# Patient Record
(demographics unavailable — no encounter records)

---

## 2025-04-01 NOTE — DISCUSSION/SUMMARY
[de-identified] : ASSESSMENT Discussed findings of today's exam and possible cause of patient's pain.  Educated patient on their most probable diagnosis of osteoarthritis of the Right Knee. Explained that osteoarthritis is a degenerative disease of the knee that causes progressive loss of articular cartilage. Risk factors for OA include age, joint iry, obesity, genetics, anatomical factors including joint shape and alignment, and sex.  Reviewed possible courses of treatment including nonoperative treatment modalities such as weight reduction, activity modification with low impact exercise, as needed use of acetaminophen or anti-inflammatory medication if tolerated, glucosamine/chondroitin supplements, and physical therapy.  Further treatment could include corticosteroid injections, hyaluronic acid injections and orthobiologics such as PRP.  Definitive treatment includes total joint arthroplasty which would require surgical consultation to further discuss that option. We collaboratively decided best course of treatment at this time includes the following:  PLAN 1. Procedures: None at this time.  We will proceed with ordering hyaluronic acid injections in follow-up if patient would like to proceed. 2. Physical Therapy: HEP of the Knee was provided to the patient in addition to a formal PT prescription to focus on core, hip, knee range of motion, quad strengthening, balance training 3. Medications: Over prescription NSAIDs however patient is currently utilizing Advil has significant alleviated pain 4. Imaging: XR reviewed with patient. See imaging. 5. Labs: None 6. Orthopedic Supplies: None 7. Restrictions: Patient was counseled regarding activity/activity modification.  Activities as tolerated, avoiding any painful activities with only slow gradual advances as tolerated and once ready.  Advised to pay close attention on whether there is any pain during and/or after the activity, in which case if this occurs, the patient should back off on the activity. 8. Referrals: None 9. Follow-up: 6 weeks/sooner for separate complaint.  Discussed shoulder/neck in addition to left foot and ankle.  Patient appreciates and agrees with current plan.  This note was generated using Dragon medical dictation software.  A reasonable effort has been made for proofreading its contents, but typos may still remain.  If there are any questions or points of clarification needed, please notify my office.  This office visit included some or all of the following of both face-to-face time (preparation for visit-reviewing prior notes, performing H&P, ordering of medications, tests/ performing procedures, and counseling/education to the patient/family) and non-face-to-face time (deciding on recommendations to patients, independently interpreting tests, documentation in the EMR, communicating with other providers before or during the visit).    I have spent a total time of 45 minutes on this patient encounter on the same day of 4/1/2025.  Paul Key MD, [facility]

## 2025-04-01 NOTE — PHYSICAL EXAM
[de-identified] : (RIGHT) KNEE EXAM  INSPECTION Appearance: No erythema, gross deformities or malalignment Effusion: None Bursa swelling: None  PALPATION Medial joint line: Positive Lateral joint line: Positive Medial retinaculum: None Lateral retinaculum: None Medial Tibial Plateau: None Lateral Tibial Plateau: None Medial Femoral Condyle: None Lateral Femoral Condyle: None Tibial Tuberosity: None MCL: None LCL: None Patellar tendon: None Quadriceps tendon: None ITB at lateral femoral condyle: None ITB at Gerdy's tubercle: None Fibular head: None Pes anserine: None  Crepitus: None Defect: None Popliteal fullness: Negative  ROM Active Flexion: Full Passive Flexion: Full Extension: Full SLR (assessing quad/patella tendon function)-able to perform  MOTOR STRENGTH Flexion: 5/5  Extension: 5/5   SENSORY INDEX Normal  PATELLOFEMORAL TESTS Patellar grind test: Positive Patellar apprehension: Negative J-sign: Negative Double leg squat: Able to perform Single leg-squat: Able to perform  ACL/PCL tests  Lachman test: Stable Anterior drawer: Stable Posterior drawer: Stable Dial Test: Stable  MCL/LCL tests  MCL Valgus laxity: Stable LCL Varus laxity: Stable  MENISCAL tests William's (positive for pain, snapping, audible clicking, locking) Medial Meniscus (full knee flexion->ER tibia->Valgus): Negative Lateral Meniscus (full knee flexion->IR tibia->Varus): Negative Thessalys: Negative  IT Band tests Malacrae's: Negative Shivam's: Negative  (LEFT) KNEE EXAM  APPEARANCE: No erythema, marked deformities, effusion or malalignment POSITIVE TENDERNESS: Default NONTENDER: jt lines b/l, patellar & quadriceps tendons, med/lat tibial plateau's and femoral condyles, MCL/LCL, ITB at the lateral femoral condyle & Gerdy's tubercle, fibular head, pes bursa. ROM: Full with active and passive flexion and extension RESISTIVE TESTING: painless knee flexion/knee extension SPECIAL TESTS: stable v/v stress. painless grind/apprehension/squat, neg Lachman's. neg ant/post drawer. neg William's/Thessaly's, Malacrae's/Shivam's  [de-identified] : XR of Date: 4/1/2025 Indication: Right Knee Pain Views: 4-Weightbearing AP, Lateral, Judith Ross Performed at [facility]: Yes  Impression: 4 views of the right knee were obtained today that show no fracture, or dislocation.  Tricompartmental osteophytes with moderate severe osteoarthrosis medial joint line, mild to moderate patellofemoral joint space  The radiographs discussed were ordered and read by me during this patient's visit.  I reviewed each radiograph detail with the patient discussed the findings highlighted in the Impression.

## 2025-04-01 NOTE — HISTORY OF PRESENT ILLNESS
[de-identified] : CC: This is a very pleasant 70-year-old female with significant past medical history of mid meniscal tear of the left knee/osteoarthritis that presents to the office today as a new patient with acute on chronic right knee pain.  No inciting event or trauma.  Patient saw her primary care doctor who ordered her physical therapy for her back she subsequently started developing some right knee pain.  She states that she has been doing physical therapy she has noticed a marked improvement.  No medications or recent imaging.  Denies any numbness/tingling/weakness.

## 2025-04-01 NOTE — DISCUSSION/SUMMARY
[de-identified] : ASSESSMENT Discussed findings of today's exam and possible cause of patient's pain.  Educated patient on their most probable diagnosis of osteoarthritis of the Right Knee. Explained that osteoarthritis is a degenerative disease of the knee that causes progressive loss of articular cartilage. Risk factors for OA include age, joint iry, obesity, genetics, anatomical factors including joint shape and alignment, and sex.  Reviewed possible courses of treatment including nonoperative treatment modalities such as weight reduction, activity modification with low impact exercise, as needed use of acetaminophen or anti-inflammatory medication if tolerated, glucosamine/chondroitin supplements, and physical therapy.  Further treatment could include corticosteroid injections, hyaluronic acid injections and orthobiologics such as PRP.  Definitive treatment includes total joint arthroplasty which would require surgical consultation to further discuss that option. We collaboratively decided best course of treatment at this time includes the following:  PLAN 1. Procedures: None at this time.  We will proceed with ordering hyaluronic acid injections in follow-up if patient would like to proceed. 2. Physical Therapy: HEP of the Knee was provided to the patient in addition to a formal PT prescription to focus on core, hip, knee range of motion, quad strengthening, balance training 3. Medications: Over prescription NSAIDs however patient is currently utilizing Advil has significant alleviated pain 4. Imaging: XR reviewed with patient. See imaging. 5. Labs: None 6. Orthopedic Supplies: None 7. Restrictions: Patient was counseled regarding activity/activity modification.  Activities as tolerated, avoiding any painful activities with only slow gradual advances as tolerated and once ready.  Advised to pay close attention on whether there is any pain during and/or after the activity, in which case if this occurs, the patient should back off on the activity. 8. Referrals: None 9. Follow-up: 6 weeks/sooner for separate complaint.  Discussed shoulder/neck in addition to left foot and ankle.  Patient appreciates and agrees with current plan.  This note was generated using Dragon medical dictation software.  A reasonable effort has been made for proofreading its contents, but typos may still remain.  If there are any questions or points of clarification needed, please notify my office.  This office visit included some or all of the following of both face-to-face time (preparation for visit-reviewing prior notes, performing H&P, ordering of medications, tests/ performing procedures, and counseling/education to the patient/family) and non-face-to-face time (deciding on recommendations to patients, independently interpreting tests, documentation in the EMR, communicating with other providers before or during the visit).    I have spent a total time of 45 minutes on this patient encounter on the same day of 4/1/2025.  Paul Key MD, [facility]

## 2025-04-01 NOTE — PHYSICAL EXAM
[de-identified] : (RIGHT) KNEE EXAM  INSPECTION Appearance: No erythema, gross deformities or malalignment Effusion: None Bursa swelling: None  PALPATION Medial joint line: Positive Lateral joint line: Positive Medial retinaculum: None Lateral retinaculum: None Medial Tibial Plateau: None Lateral Tibial Plateau: None Medial Femoral Condyle: None Lateral Femoral Condyle: None Tibial Tuberosity: None MCL: None LCL: None Patellar tendon: None Quadriceps tendon: None ITB at lateral femoral condyle: None ITB at Gerdy's tubercle: None Fibular head: None Pes anserine: None  Crepitus: None Defect: None Popliteal fullness: Negative  ROM Active Flexion: Full Passive Flexion: Full Extension: Full SLR (assessing quad/patella tendon function)-able to perform  MOTOR STRENGTH Flexion: 5/5  Extension: 5/5   SENSORY INDEX Normal  PATELLOFEMORAL TESTS Patellar grind test: Positive Patellar apprehension: Negative J-sign: Negative Double leg squat: Able to perform Single leg-squat: Able to perform  ACL/PCL tests  Lachman test: Stable Anterior drawer: Stable Posterior drawer: Stable Dial Test: Stable  MCL/LCL tests  MCL Valgus laxity: Stable LCL Varus laxity: Stable  MENISCAL tests William's (positive for pain, snapping, audible clicking, locking) Medial Meniscus (full knee flexion->ER tibia->Valgus): Negative Lateral Meniscus (full knee flexion->IR tibia->Varus): Negative Thessalys: Negative  IT Band tests Malacrae's: Negative Shivam's: Negative  (LEFT) KNEE EXAM  APPEARANCE: No erythema, marked deformities, effusion or malalignment POSITIVE TENDERNESS: Default NONTENDER: jt lines b/l, patellar & quadriceps tendons, med/lat tibial plateau's and femoral condyles, MCL/LCL, ITB at the lateral femoral condyle & Gerdy's tubercle, fibular head, pes bursa. ROM: Full with active and passive flexion and extension RESISTIVE TESTING: painless knee flexion/knee extension SPECIAL TESTS: stable v/v stress. painless grind/apprehension/squat, neg Lachman's. neg ant/post drawer. neg William's/Thessaly's, Malacrae's/Shivam's  [de-identified] : XR of Date: 4/1/2025 Indication: Right Knee Pain Views: 4-Weightbearing AP, Lateral, Judith Ross Performed at [facility]: Yes  Impression: 4 views of the right knee were obtained today that show no fracture, or dislocation.  Tricompartmental osteophytes with moderate severe osteoarthrosis medial joint line, mild to moderate patellofemoral joint space  The radiographs discussed were ordered and read by me during this patient's visit.  I reviewed each radiograph detail with the patient discussed the findings highlighted in the Impression.

## 2025-04-01 NOTE — HISTORY OF PRESENT ILLNESS
[de-identified] : CC: This is a very pleasant 70-year-old female with significant past medical history of mid meniscal tear of the left knee/osteoarthritis that presents to the office today as a new patient with acute on chronic right knee pain.  No inciting event or trauma.  Patient saw her primary care doctor who ordered her physical therapy for her back she subsequently started developing some right knee pain.  She states that she has been doing physical therapy she has noticed a marked improvement.  No medications or recent imaging.  Denies any numbness/tingling/weakness.

## 2025-05-13 NOTE — PROCEDURE
[de-identified] : Indication: [Osteoarthritis of the right knee]  Injection: Ultrasound-guided intra-articular Hyaluronic Acid  injection of right knee  Pre-Procedure: A discussion was had with the patient regarding this procedure and all questions were answered.  All risks, benefits, and alternatives were discussed.  These included but were not limited to bleeding, infection, and allergic reaction.   Procedure: A timeout was performed prior to the procedure to ensure proper side.  Alcohol was used to clean the skin, and Betadine was used to sterilize and prep the area overlying the knee joint. Ensured placement within the intra-articular right knee joint utilizing the [employer] ultrasound machine, the Linear 6 cm 15-6 MHz transducer, sterile probe cover, and sterile ultrasound gel. Ethyl chloride spray was then used as a topical anesthetic. Ultrasound guidance with the probe in short axis to the joint, utilizing an in-plane approach was used. Following a [21-gauge/22-gauge] needle was used to inject 2 cc's of Euflexxa into the joint from a superolateral approach.  Post-procedure: A sterile bandage was then applied. The patient tolerated the procedure well, and there were no complications. The patient will follow-up in one week for their second HA injection  HA injection: Euflexxa Lot number: [id number] Expiration: [04/07/2026

## 2025-05-20 NOTE — PROCEDURE
[de-identified] : Indication: Osteoarthritis of the Right knee  Injection: Ultrasound-guided intra-articular Hyaluronic Acid Injection of Right Knee  Pre-Procedure: A discussion was had with the patient regarding this procedure and all questions were answered. All risks, benefits, and alternatives were discussed. These included but were not limited to bleeding, infection, and allergic reaction.  Procedure: A timeout was performed prior to the procedure to ensure proper side. Alcohol was used to clean the skin, and Betadine was used to sterilize and prep the area overlying the knee joint. Ensured placement within the intra-articular right knee joint utilizing the [employer] ultrasound machine, the Linear 6 cm 15-6 MHz transducer, sterile probe cover, and sterile ultrasound gel. Ethyl chloride spray was then used as a topical anesthetic. Ultrasound guidance with the probe in short axis to the joint, utilizing an in-plane approach was used. Following a 22-gauge needle was used to inject 2 cc's of Euflexxa into the joint from a superolateral approach.  Post-procedure: A sterile bandage was then applied. The patient tolerated the procedure well, and there were no complications. The patient will follow-up in one week for their second HA injection  HA injection: Euflexxa Lot number: [id number] Expiration: 04/07/2026.

## 2025-05-20 NOTE — PROCEDURE
[de-identified] : Indication: Osteoarthritis of the Right knee  Injection: Ultrasound-guided intra-articular Hyaluronic Acid Injection of Right Knee  Pre-Procedure: A discussion was had with the patient regarding this procedure and all questions were answered. All risks, benefits, and alternatives were discussed. These included but were not limited to bleeding, infection, and allergic reaction.  Procedure: A timeout was performed prior to the procedure to ensure proper side. Alcohol was used to clean the skin, and Betadine was used to sterilize and prep the area overlying the knee joint. Ensured placement within the intra-articular right knee joint utilizing the "[employer] ultrasound machine, the Linear 6 cm 15-6 MHz transducer, sterile probe cover, and sterile ultrasound gel. Ethyl chloride spray was then used as a topical anesthetic. Ultrasound guidance with the probe in short axis to the joint, utilizing an in-plane approach was used. Following a 22-gauge needle was used to inject 2 cc's of Euflexxa into the joint from a superolateral approach.  Post-procedure: A sterile bandage was then applied. The patient tolerated the procedure well, and there were no complications. The patient will follow-up in one week for their second HA injection  HA injection: Euflexxa Lot number: [id number] Expiration: 04/07/2026.

## 2025-05-20 NOTE — PROCEDURE
[de-identified] : Indication: Osteoarthritis of the Right knee  Injection: Ultrasound-guided intra-articular Hyaluronic Acid Injection of Right Knee  Pre-Procedure: A discussion was had with the patient regarding this procedure and all questions were answered. All risks, benefits, and alternatives were discussed. These included but were not limited to bleeding, infection, and allergic reaction.  Procedure: A timeout was performed prior to the procedure to ensure proper side. Alcohol was used to clean the skin, and Betadine was used to sterilize and prep the area overlying the knee joint. Ensured placement within the intra-articular right knee joint utilizing the [employer] ultrasound machine, the Linear 6 cm 15-6 MHz transducer, sterile probe cover, and sterile ultrasound gel. Ethyl chloride spray was then used as a topical anesthetic. Ultrasound guidance with the probe in short axis to the joint, utilizing an in-plane approach was used. Following a 22-gauge needle was used to inject 2 cc's of Euflexxa into the joint from a superolateral approach.  Post-procedure: A sterile bandage was then applied. The patient tolerated the procedure well, and there were no complications. The patient will follow-up in one week for their second HA injection  HA injection: Euflexxa Lot number: [id number] Expiration: 04/07/2026.

## 2025-05-20 NOTE — PROCEDURE
[de-identified] : Indication: Osteoarthritis of the Right knee  Injection: Ultrasound-guided intra-articular Hyaluronic Acid Injection of Right Knee  Pre-Procedure: A discussion was had with the patient regarding this procedure and all questions were answered. All risks, benefits, and alternatives were discussed. These included but were not limited to bleeding, infection, and allergic reaction.  Procedure: A timeout was performed prior to the procedure to ensure proper side. Alcohol was used to clean the skin, and Betadine was used to sterilize and prep the area overlying the knee joint. Ensured placement within the intra-articular right knee joint utilizing the [employer] ultrasound machine, the Linear 6 cm 15-6 MHz transducer, sterile probe cover, and sterile ultrasound gel. Ethyl chloride spray was then used as a topical anesthetic. Ultrasound guidance with the probe in short axis to the joint, utilizing an in-plane approach was used. Following a 22-gauge needle was used to inject 2 cc's of Euflexxa into the joint from a superolateral approach.  Post-procedure: A sterile bandage was then applied. The patient tolerated the procedure well, and there were no complications. The patient will follow-up in one week for their second HA injection  HA injection: Euflexxa Lot number: [id number] Expiration: 04/07/2026.

## 2025-05-27 NOTE — PROCEDURE
[de-identified] : Indication: Osteoarthritis of the Right knee  Injection: Ultrasound-guided intra-articular Hyaluronic Acid Injection of Right Knee  Pre-Procedure: A discussion was had with the patient regarding this procedure and all questions were answered. All risks, benefits, and alternatives were discussed. These included but were not limited to bleeding, infection, and allergic reaction.  Procedure: A timeout was performed prior to the procedure to ensure proper side. Alcohol was used to clean the skin, and Betadine was used to sterilize and prep the area overlying the knee joint. Ensured placement within the intra-articular right knee joint utilizing the [employer] ultrasound machine, the Linear 6 cm 15-6 MHz transducer, sterile probe cover, and sterile ultrasound gel. Ethyl chloride spray was then used as a topical anesthetic. Ultrasound guidance with the probe in short axis to the joint, utilizing an in-plane approach was used. Following a 22-gauge needle was used to inject 2 cc's of Euflexxa into the joint from a superolateral approach.  Post-procedure: A sterile bandage was then applied. The patient tolerated the procedure well, and there were no complications.  Postprocedural allergies and pain discussed at length with patient.  She is going to [place] and will be doing a lot of walking.  Advised to utilize anti-inflammatories as needed for pain in addition to any knee sleeve.  HA injection: Euflexxa Lot number: [id number] Expiration: 04/07/2026.

## 2025-05-27 NOTE — PROCEDURE
[de-identified] : Indication: Osteoarthritis of the Right knee  Injection: Ultrasound-guided intra-articular Hyaluronic Acid Injection of Right Knee  Pre-Procedure: A discussion was had with the patient regarding this procedure and all questions were answered. All risks, benefits, and alternatives were discussed. These included but were not limited to bleeding, infection, and allergic reaction.  Procedure: A timeout was performed prior to the procedure to ensure proper side. Alcohol was used to clean the skin, and Betadine was used to sterilize and prep the area overlying the knee joint. Ensured placement within the intra-articular right knee joint utilizing the [employer] ultrasound machine, the Linear 6 cm 15-6 MHz transducer, sterile probe cover, and sterile ultrasound gel. Ethyl chloride spray was then used as a topical anesthetic. Ultrasound guidance with the probe in short axis to the joint, utilizing an in-plane approach was used. Following a 22-gauge needle was used to inject 2 cc's of Euflexxa into the joint from a superolateral approach.  Post-procedure: A sterile bandage was then applied. The patient tolerated the procedure well, and there were no complications.  Postprocedural allergies and pain discussed at length with patient.  She is going to [place] and will be doing a lot of walking.  Advised to utilize anti-inflammatories as needed for pain in addition to any knee sleeve.  HA injection: Euflexxa Lot number: [id number] Expiration: 04/07/2026.

## 2025-07-22 NOTE — DISCUSSION/SUMMARY
[de-identified] : ASSESSMENT 70-year-old female presents today for follow-up of left knee osteoarthritis.  She is traveling to [place] for the semester and will be returning in February 2026.:  PLAN 1. Procedures: Discussed timeline of bilateral neck acid injections and the ability to do them every 6 months.  Also discussed corticosteroid injections for which she can have while in [place] point. 2. Physical Therapy: HEP of the Knee was provided to the patient in addition to a formal PT prescription to focus on core, hip, knee range of motion, quad strengthening, balance training 3. Medications: As needed 4. Imaging: XR reviewed with patient. See imaging. 5. Labs: None 6. Orthopedic Supplies: None 7. Restrictions: Patient was counseled regarding activity/activity modification.  Activities as tolerated, avoiding any painful activities with only slow gradual advances as tolerated and once ready.  Advised to pay close attention on whether there is any pain during and/or after the activity, in which case if this occurs, the patient should back off on the activity. 8. Referrals: None 9. Follow-up: When she returns to [place] February/March 2026.  Patient appreciates and agrees with current plan.  This note was generated using Dragon medical dictation software.  A reasonable effort has been made for proofreading its contents, but typos may still remain.  If there are any questions or points of clarification needed, please notify my office.  Paul Key MD, [facility]

## 2025-07-22 NOTE — HISTORY OF PRESENT ILLNESS
[de-identified] : 7/22/2025: Patient presents for follow-up of left knee osteoarthritis.  Last visit we completed a series of 3 weeks of hyaluronic acid injections.  Patient notes marked improvement.  She recently was in [place] in [place] and was doing a lot of walking and noticed some knee pain.  She was persistent with her home exercises and when she returned to the end states she felt an improvement in pain.  Sometimes she notices some achiness however pain is improved.  Denies any locking.  4/1/2025: This is a very pleasant 70-year-old female with significant past medical history of mid meniscal tear of the left knee/osteoarthritis that presents to the office today as a new patient with acute on chronic right knee pain.  No inciting event or trauma.  Patient saw her primary care doctor who ordered her physical therapy for her back she subsequently started developing some right knee pain.  She states that she has been doing physical therapy she has noticed a marked improvement.  No medications or recent imaging.  Denies any numbness/tingling/weakness.

## 2025-07-22 NOTE — PHYSICAL EXAM
[de-identified] : (RIGHT) KNEE EXAM  INSPECTION Appearance: No erythema, gross deformities or malalignment Effusion: None Bursa swelling: None  PALPATION Medial joint line: Positive Lateral joint line: Positive Medial retinaculum: None Lateral retinaculum: None Medial Tibial Plateau: None Lateral Tibial Plateau: None Medial Femoral Condyle: None Lateral Femoral Condyle: None Tibial Tuberosity: None MCL: None LCL: None Patellar tendon: None Quadriceps tendon: None ITB at lateral femoral condyle: None ITB at Gerdy's tubercle: None Fibular head: None Pes anserine: None  Crepitus: None Defect: None Popliteal fullness: Negative  ROM Active Flexion: Full Passive Flexion: Full Extension: Full SLR (assessing quad/patella tendon function)-able to perform  MOTOR STRENGTH Flexion: 5/5  Extension: 5/5   SENSORY INDEX Normal  PATELLOFEMORAL TESTS Patellar grind test: Positive Patellar apprehension: Negative J-sign: Negative Double leg squat: Able to perform Single leg-squat: Able to perform  ACL/PCL tests  Lachman test: Stable Anterior drawer: Stable Posterior drawer: Stable Dial Test: Stable  MCL/LCL tests  MCL Valgus laxity: Stable LCL Varus laxity: Stable  MENISCAL tests William's (positive for pain, snapping, audible clicking, locking) Medial Meniscus (full knee flexion->ER tibia->Valgus): Negative Lateral Meniscus (full knee flexion->IR tibia->Varus): Negative Thessalys: Negative  IT Band tests Malacrae's: Negative Shivam's: Negative  (LEFT) KNEE EXAM  APPEARANCE: No erythema, marked deformities, effusion or malalignment POSITIVE TENDERNESS: Default NONTENDER: jt lines b/l, patellar & quadriceps tendons, med/lat tibial plateau's and femoral condyles, MCL/LCL, ITB at the lateral femoral condyle & Gerdy's tubercle, fibular head, pes bursa. ROM: Full with active and passive flexion and extension RESISTIVE TESTING: painless knee flexion/knee extension SPECIAL TESTS: stable v/v stress. painless grind/apprehension/squat, neg Lachman's. neg ant/post drawer. neg William's/Thessaly's, Malacrae's/Shivam's  [de-identified] : XR of Date: 4/1/2025 Indication: Right Knee Pain Views: 4-Weightbearing AP, Lateral, [facility] Performed at [facility]: Yes  Impression: 4 views of the right knee were obtained today that show no fracture, or dislocation.  Tricompartmental osteophytes with moderate severe osteoarthrosis medial joint line, mild to moderate patellofemoral joint space  The radiographs discussed were ordered and read by me during this patient's visit.  I reviewed each radiograph detail with the patient discussed the findings highlighted in the Impression.